# Patient Record
Sex: MALE | Race: WHITE
[De-identification: names, ages, dates, MRNs, and addresses within clinical notes are randomized per-mention and may not be internally consistent; named-entity substitution may affect disease eponyms.]

---

## 2018-05-03 ENCOUNTER — HOSPITAL ENCOUNTER (OUTPATIENT)
Dept: HOSPITAL 56 - MW.SDS | Age: 48
Discharge: HOME | End: 2018-05-03
Attending: PAIN MEDICINE
Payer: COMMERCIAL

## 2018-05-03 DIAGNOSIS — E78.1: ICD-10-CM

## 2018-05-03 DIAGNOSIS — J30.9: ICD-10-CM

## 2018-05-03 DIAGNOSIS — E66.9: ICD-10-CM

## 2018-05-03 DIAGNOSIS — G89.29: Primary | ICD-10-CM

## 2018-05-03 DIAGNOSIS — J01.90: ICD-10-CM

## 2018-05-03 DIAGNOSIS — F41.9: ICD-10-CM

## 2018-05-03 DIAGNOSIS — M48.061: ICD-10-CM

## 2018-05-03 DIAGNOSIS — F17.210: ICD-10-CM

## 2018-05-03 DIAGNOSIS — Z79.899: ICD-10-CM

## 2018-05-03 DIAGNOSIS — M51.16: ICD-10-CM

## 2018-05-03 DIAGNOSIS — M47.26: ICD-10-CM

## 2018-05-03 PROCEDURE — 62323 NJX INTERLAMINAR LMBR/SAC: CPT

## 2018-05-03 NOTE — OR
SURGEON:

Haley Mcnamara D.O.

 

DATE OF PROCEDURE:  05/03/2018

 

OR STAFF PRESENT:

1. BRYON Tsang.

2. ALFREDO Arnett RN.

3. RT Dianne.

 

WOUND CLASSIFICATION:

I.

 

PREOPERATIVE DIAGNOSES:

1. Lumbar degenerative disk disease.

2. Lumbar neural foraminal stenosis.

3. Lumbar radiculopathy.

4. Lumbar spondylosis.

 

POSTOPERATIVE DIAGNOSES:

1. Lumbar degenerative disk disease.

2. Lumbar neural foraminal stenosis.

3. Lumbar radiculopathy.

4. Lumbar spondylosis.

 

PROCEDURES PERFORMED:

1. Caudal epidural steroid injection.

2. Fluoroscopic guidance for needle placement.

3. Local with oral Valium for sedation.

 

SCREENING QUESTIONS:

The patient answered "no" to all of the following questions:

1. Are  you allergic to latex?

2. Do you have a bleeding disorder?

3. Do you have any current local or systemic infections?

4. Are you taking any anti-inflammatories or blood thinners?

5. Do you have any joint replacements, heart valve replacements, or a

    pacemaker?

 

DESCRIPTION OF PROCEDURE:

The patient had the procedure thoroughly explained including all possible risks,

benefits and alternatives.  Consent was signed in my clinic indicating

understanding and willingness to proceed.  The patient presented to [facility] [place] and was escorted to the dressing room to disrobe and

change into a hospital gown.  Preoperative vital signs were taken and stable.

The patient reported that Valium was taken prior to the procedure.

 

The patient was brought back to the procedure room and placed in the prone

position on the procedure room table. A pillow was placed under the hips in

order to flatten the lumbar lordosis.  The back was prepped with ChloraPrep and

sterilely draped.  All personnel in the operating room were dressed in

appropriate attire including surgical scrubs, head and shoe covers. This was to

ensure sterility while in the treatment room.  During the time fluoroscopy was

in use, all personnel in the operating room wore lead shields with thyroid

collars.  Sterile technique was used throughout the procedure. The patient was

awake and conversant throughout the procedure.  There was no evidence of

infection at the site of needle insertion.  Skeletal landmarks were identified

under fluoroscopy for the caudal epidural.

 

Skin was anesthetized with 2% lidocaine with a sterile 27-gauge 1.5 inch needle.

Then a 20-gauge Tuohy epidural needle was placed in the epidural space with loss

of resistance technique under fluoroscopic guidance.  No heme, cerebrospinal

fluid, or paresthesias were noted.  Isovue-200 contrast dye was injected in 0.2

cubic centimeter increments and seen to outline the epidural space in both AP

and lateral views.  There was no intravascular flow pattern observed under live

fluoroscopy.  Then 12 milligrams of Celestone was slowly injected after negative

aspiration.  The patient tolerated the procedure well.  Vital signs were stable

during and after the procedure.

 

The staff escorted the patient to the recovery area and the patient was released

in stable condition after a brief stay in the recovery room monitored by the

nurse.  The patient was given both oral and written discharge and follow up

instructions with recommendation to follow up given for 2-3 weeks.

 

The patient voiced understanding including understanding of those signs and

symptoms that would require emergency care.  The patient knows how to contact

the office if there are any additional problems or questions in the meantime.

 

PREOPERATIVE PAIN:

8/10.

 

POSTOPERATIVE PAIN:

6/10.

 

PLAN:

Follow up in the Pain Clinic in 3 weeks.

 

 

YOEL / CONNER

DD:  05/03/2018 12:57:23

DT:  05/03/2018 13:28:21

Job #:  [id number]

FANTA

## 2018-05-31 ENCOUNTER — HOSPITAL ENCOUNTER (OUTPATIENT)
Dept: HOSPITAL 56 - MW.SDS | Age: 48
Discharge: HOME | End: 2018-05-31
Attending: PAIN MEDICINE
Payer: COMMERCIAL

## 2018-05-31 DIAGNOSIS — M67.441: ICD-10-CM

## 2018-05-31 DIAGNOSIS — M46.96: ICD-10-CM

## 2018-05-31 DIAGNOSIS — M48.061: ICD-10-CM

## 2018-05-31 DIAGNOSIS — K21.9: ICD-10-CM

## 2018-05-31 DIAGNOSIS — M51.37: ICD-10-CM

## 2018-05-31 DIAGNOSIS — F17.210: ICD-10-CM

## 2018-05-31 DIAGNOSIS — M47.26: ICD-10-CM

## 2018-05-31 DIAGNOSIS — F41.9: ICD-10-CM

## 2018-05-31 DIAGNOSIS — J01.90: ICD-10-CM

## 2018-05-31 DIAGNOSIS — M12.88: ICD-10-CM

## 2018-05-31 DIAGNOSIS — J30.9: ICD-10-CM

## 2018-05-31 DIAGNOSIS — M94.0: ICD-10-CM

## 2018-05-31 DIAGNOSIS — E78.1: ICD-10-CM

## 2018-05-31 DIAGNOSIS — G57.03: ICD-10-CM

## 2018-05-31 DIAGNOSIS — G89.29: Primary | ICD-10-CM

## 2018-05-31 DIAGNOSIS — E66.9: ICD-10-CM

## 2018-05-31 DIAGNOSIS — Z79.899: ICD-10-CM

## 2018-05-31 PROCEDURE — G0260 INJ FOR SACROILIAC JT ANESTH: HCPCS

## 2018-05-31 NOTE — OR
SURGEON:

Haley Mcnamara D.O.

 

DATE OF PROCEDURE:  2018

 

OR STAFF PRESENT:

1. BRYON Servin RN.

2. BRYON Salguero RN.

3. RT Sen.

 

WOUND CLASSIFICATION:

I.

 

PREOPERATIVE DIAGNOSES:

1. Lumbar degenerative disk disease.

2. Bilateral sacroiliac joint arthropathy.

3. Lumbar facet arthropathy.

 

POSTOPERATIVE DIAGNOSES:

1. Lumbar degenerative disk disease.

2. Bilateral sacroiliac joint arthropathy.

3. Lumbar facet arthropathy.

 

PROCEDURES PERFORMED:

1. Right sacroiliac joint injection.

2. Left sacroiliac joint injection.

3. Fluoroscopic guidance for needle placement.

4. Local with oral Valium for sedation.

 

SCREENING QUESTIONS:

The patient answered "No" to all the followin. Are you allergic to iodine, Betadine or latex?

2. Do you have a bleeding disorder?

3. Do you  have any joint replacements, heart valve replacements or a

    pacemaker?

4. Are you on any anti-inflammatories or blood thinners?

5. Do you have any current local or systemic infections?

 



 

DESCRIPTION OF PROCEDURE:

The patient had the procedure thoroughly explained including all possible risks,

benefits and alternatives.  Consent was signed in my clinic indicating

understanding and willingness to proceed.

 

The patient presented to the outpatient [facility] and was escorted to the

dressing room to disrobe and change into a hospital gown.  Preoperative vital

signs were taken and stable.  The patient reported that Valium was taken prior

to the procedure.

 

The patient was brought to the procedure room and placed in the prone position

on the procedure room table.  A pillow was placed under the hips in order to

flatten the lumbar lordosis.  The back was prepped with ChloraPrep and sterilely

draped.

 

All personnel in the operating room were dressed in appropriate attire including

surgical scrubs, head and shoe covers.  This was to ensure sterility while in

the treatment room.  During the time fluoroscopy was in use all personnel in the

operating room wore lead shields with thyroid collars.  Sterile technique was

used during the procedure.  The patient was awake and conversant throughout the

procedure.

 

The fluoroscope was positioned to provide an oblique view of the right 
sacroiliac

joint.  There was no evidence of infection at the site of needle insertion.  The

skin was anesthetized with 2% Lidocaine with a sterile 27-gauge 1.5 inch needle.

Then under fluoroscopy  a 22-gauge 3.5 inch spinal needle was placed within the

sacroiliac joint in the lower one-third of the joint.  Isovue-200 contrast dye

was injected under live fluoroscopy and no intravascular flow pattern was

observed.  After negative aspiration of heme, the following solution was

injected: 0.5% Ropivacaine, Celestone and 2% Lidocaine. 

Then the procedure was repeated as above for the left sacroiliac joint 
injection.



The patient tolerated the procedures well and vital signs were stable during and

after the procedure.

 

The staff escorted the patient to the recovery area and the patient was released

to home in stable condition after a brief stay in the recovery room monitored by

the nurse.  The patient was given both oral and written discharge and follow up

instructions.

 

Recommended follow up in two weeks.  The patient is able to contact the office

if there are any additional problems or questions in the meantime.  The patient

was given discharge instruction and verbalizes understanding including

understanding of those signs and symptoms that would require emergency care.

 

PREOPERATIVE PAIN:

9/10.

 

POSTOPERATIVE PAIN:

/10.

 

PLAN:

Follow up in the Pain Clinic in 3 weeks.

 

 

YOEL / CONNER

DD:  2018 15:12:24

DT:  2018 16:16:39

Job #:  [id number]

FANTA

## 2018-07-10 ENCOUNTER — HOSPITAL ENCOUNTER (OUTPATIENT)
Dept: HOSPITAL 56 - MW.SDS | Age: 48
Discharge: HOME | End: 2018-07-10
Attending: PAIN MEDICINE
Payer: COMMERCIAL

## 2018-07-10 DIAGNOSIS — E66.9: ICD-10-CM

## 2018-07-10 DIAGNOSIS — M48.061: ICD-10-CM

## 2018-07-10 DIAGNOSIS — M51.16: Primary | ICD-10-CM

## 2018-07-10 DIAGNOSIS — K21.9: ICD-10-CM

## 2018-07-10 DIAGNOSIS — M46.97: ICD-10-CM

## 2018-07-10 DIAGNOSIS — M47.896: ICD-10-CM

## 2018-07-10 DIAGNOSIS — F17.210: ICD-10-CM

## 2018-07-10 DIAGNOSIS — M79.1: ICD-10-CM

## 2018-07-10 DIAGNOSIS — F41.9: ICD-10-CM

## 2018-07-10 DIAGNOSIS — Z79.899: ICD-10-CM

## 2018-07-10 PROCEDURE — 62323 NJX INTERLAMINAR LMBR/SAC: CPT

## 2018-07-10 NOTE — OR
SURGEON:

Haley Mcnamara D.O.

 

DATE OF PROCEDURE:  07/10/2018

 

OR STAFF PRESENT:

1. BRYON Tsang RN.

2. BRYON Grubbs RN.

3. WALTER Anand RT.

 

WOUND CLASSIFICATION:

I.

 

PREOPERATIVE DIAGNOSES:

1. Lumbar degenerative disk disease.

2. Lumbar spinal stenosis.

3. Lumbar radiculopathy.

4. Lumbar spondylosis.

5. Increased BMI.

 

POSTOPERATIVE DIAGNOSES:

1. Lumbar degenerative disk disease.

2. Lumbar spinal stenosis.

3. Lumbar radiculopathy.

4. Lumbar spondylosis.

5. Increased BMI.

 

PROCEDURES PERFORMED:

1. Lumbar epidural steroid injection, interlaminar at L4-L5.

2. Fluoroscopic guidance for needle placement.

3. Local with oral Valium for sedation.

 

SCREENING QUESTIONS:

The patient answered "no" to all of the following questions:

1. Are  you allergic to latex?

2. Do you have a bleeding disorder?

3. Do you have any current local or systemic infections?

4. Are you taking any anti-inflammatories or blood thinners?

5. Do you have any joint replacements, heart valve replacements, or a

    pacemaker?

 

DESCRIPTION OF PROCEDURE:

The patient had the procedure thoroughly explained including all possible risks,

benefits and alternatives.  Consent was signed in my clinic indicating

understanding and willingness to proceed.  The patient presented to [facility] and was escorted to the dressing room to disrobe and

change into a hospital gown.  Preoperative vital signs were taken and stable.

The patient reported that Valium was taken prior to the procedure.

 

The patient was brought back to the procedure room and placed in the prone

position on the procedure room table. A pillow was placed under the hips in

order to flatten the lumbar lordosis.  The back was prepped with ChloraPrep and

sterilely draped.  All personnel in the operating room were dressed in

appropriate attire including surgical scrubs, head and shoe covers. This was to

ensure sterility while in the treatment room.  During the time fluoroscopy was

in use, all personnel in the operating room wore lead shields with thyroid

collars.  Sterile technique was used throughout the procedure. The patient was

awake and conversant throughout the procedure.  There was no evidence of

infection at the site of needle insertion.  Skeletal landmarks were identified

under fluoroscopy for the lumbar epidural.

 

Skin was anesthetized with 2% lidocaine with a sterile 27-gauge 1.5 inch needle.

Then, a 20-gauge Tuohy epidural needle was placed in the epidural space with

loss of resistance technique under fluoroscopic guidance.  No heme,

cerebrospinal fluid, or paresthesias were noted.  Isovue-200 contrast dye was

injected in 0.2 cubic centimeter increments and seen to outline the epidural

space in both AP and lateral views.  There was no intravascular flow pattern

observed under live fluoroscopy.  Then, 12 milligrams of Celestone was slowly

injected after negative aspiration.  The patient tolerated the procedure well.

Vital signs were stable during and after the procedure.

 

The staff escorted the patient to the recovery area and the patient was released

in stable condition after a brief stay in the recovery room monitored by the

nurse.  The patient was given both oral and written discharge and follow up

instructions with recommendation to follow up given for 2-3 weeks.

 

The patient voiced understanding including understanding of those signs and

symptoms that would require emergency care.  The patient knows how to contact

the office if there are any additional problems or questions in the meantime.

 

PREOPERATIVE PAIN:

8/10.

 

POSTOPERATIVE PAIN:

1/10.

 

FOLLOWUP:

Follow up in Pain Clinic in 3 weeks.

 

 

YOEL / CONNER

DD:  07/10/2018 14:46:24

DT:  07/10/2018 14:51:16

Job #:  [id number]

## 2019-02-22 ENCOUNTER — HOSPITAL ENCOUNTER (OUTPATIENT)
Dept: HOSPITAL 56 - MW.SDS | Age: 49
Discharge: HOME | End: 2019-02-22
Attending: SURGERY
Payer: COMMERCIAL

## 2019-02-22 VITALS — SYSTOLIC BLOOD PRESSURE: 120 MMHG | DIASTOLIC BLOOD PRESSURE: 75 MMHG

## 2019-02-22 DIAGNOSIS — Z79.899: ICD-10-CM

## 2019-02-22 DIAGNOSIS — E66.9: ICD-10-CM

## 2019-02-22 DIAGNOSIS — G89.29: ICD-10-CM

## 2019-02-22 DIAGNOSIS — Z79.1: ICD-10-CM

## 2019-02-22 DIAGNOSIS — Z80.0: ICD-10-CM

## 2019-02-22 DIAGNOSIS — K21.9: ICD-10-CM

## 2019-02-22 DIAGNOSIS — Z79.2: ICD-10-CM

## 2019-02-22 DIAGNOSIS — M19.90: ICD-10-CM

## 2019-02-22 DIAGNOSIS — K63.5: ICD-10-CM

## 2019-02-22 DIAGNOSIS — F41.9: ICD-10-CM

## 2019-02-22 DIAGNOSIS — K64.8: ICD-10-CM

## 2019-02-22 DIAGNOSIS — K62.5: Primary | ICD-10-CM

## 2019-02-22 DIAGNOSIS — F17.210: ICD-10-CM

## 2019-02-22 DIAGNOSIS — E78.1: ICD-10-CM

## 2019-02-22 DIAGNOSIS — D12.8: ICD-10-CM

## 2019-02-22 DIAGNOSIS — E78.00: ICD-10-CM

## 2019-02-22 DIAGNOSIS — J30.9: ICD-10-CM

## 2019-02-22 DIAGNOSIS — M54.5: ICD-10-CM

## 2019-02-22 DIAGNOSIS — Z79.51: ICD-10-CM

## 2019-02-22 PROCEDURE — 45381 COLONOSCOPY SUBMUCOUS NJX: CPT

## 2019-02-22 PROCEDURE — 45385 COLONOSCOPY W/LESION REMOVAL: CPT

## 2019-02-22 NOTE — PCM.PREANE
Preanesthetic Assessment





- Anesthesia/Transfusion/Family Hx


Anesthesia History: Prior Anesthesia Without Reaction


Family History of Anesthesia Reaction: No


Transfusion History: No Prior Transfusion(s)





- Review of Systems


General: No Symptoms


Pulmonary: No Symptoms


Cardiovascular: No Symptoms


Gastrointestinal: No Symptoms


Neurological: No Symptoms


Other: Reports: None





- Physical Assessment


NPO Status Date: 02/21/19


O2 Sat by Pulse Oximetry: 96


Respiratory Rate: 16


Vital Signs: 





 Last Vital Signs











Temp  96.6 F   02/22/19 07:10


 


Pulse  77   02/22/19 07:10


 


Resp  16   02/22/19 07:10


 


BP  137/89   02/22/19 07:10


 


Pulse Ox  96   02/22/19 07:10











Height: 6 ft


Weight: 107.955 kg


ASA Class: 2


Mental Status: Alert & Oriented x3


Dentition: Reports: Normal Dentition


ROM/Head Extension: Full


Lungs: Clear to Auscultation, Normal Respiratory Effort


Cardiovascular: Regular Rate, Regular Rhythm





- Allergies


Allergies/Adverse Reactions: 


 Allergies











Allergy/AdvReac Type Severity Reaction Status Date / Time


 


No Known Allergies Allergy   Verified 02/19/19 12:40














- Blood


Blood Available: No





- Anesthesia Plan


Pre-Op Medication Ordered: None





- Acknowledgements


Anesthesia Type Planned: General Anesthesia, MAC


Pt an Appropriate Candidate for the Planned Anesthesia: Yes


Alternatives and Risks of Anesthesia Discussed w Pt/Guardian: Yes


Pt/Guardian Understands and Agrees with Anesthesia Plan: Yes


Additional Comments: 





PMH: smoker, gerd, on gabipentin for chronic LBP


PLAN: mac/tiva





PreAnesthesia Questionnaire


HEENT History: Reports: Other (See Below)


Other HEENT History: wears glasses


Cardiovascular History: Reports: High Cholesterol


Gastrointestinal History: Reports: GERD


Genitourinary History: Reports: Other (See Below)


Other Genitourinary History: testicular cancer


Musculoskeletal History: Reports: Arthritis, Back Pain, Chronic


Psychiatric History: Reports: Anxiety


Endocrine/Metabolic History: Reports: Obesity/BMI 30+


Oncologic (Cancer) History: Reports: Other (See Below)


Other Oncologic History: testicular





- Past Surgical History


Head Surgeries/Procedures: Reports: None


Male  Surgical History: Reports: Other (See Below)


Other Male  Surgeries/Procedures: orchiectomy Left related to testicular 

cancer





- SUBSTANCE USE


Smoking Status *Q: Light Tobacco Smoker


Tobacco Use Within Last Twelve Months: Cigarettes


Recreational Drug Use History: No





- HOME MEDS


Home Medications: 


 Home Meds





ALPRAZolam [Xanax] 1 tab PO ASDIRECTED PRN 06/24/15 [History]


Celecoxib 200 mg PO DAILY 02/19/19 [History]


Fluticasone Propionate [Flonase Allergy Relief] 1 spray NASBOTH BID PRN 02/19/ 19 [History]


Omeprazole 20 mg PO DAILY 02/19/19 [History]


Rosuvastatin Calcium 40 mg PO DAILY 02/19/19 [History]


Sertraline HCl 100 mg PO DAILY 02/19/19 [History]


oxyCODONE HCl/Acetaminophen [Endocet  mg Tablet] 1 tab PO ASDIRECTED PRN 

02/19/19 [History]


traMADol HCl [Tramadol HCl] 1 - 2 tab PO ASDIRECTED PRN 02/19/19 [History]











- CURRENT (IN HOUSE) MEDS


Current Meds: 





 Current Medications





Lactated Ringer's (Ringers, Lactated)  1,000 mls @ 125 mls/hr IV ASDIRECTED SHARRI


   Last Admin: 02/22/19 07:27 Dose:  125 mls/hr





Discontinued Medications





Fentanyl (Sublimaze) Confirm Administered Dose 100 mcg .ROUTE .STK-MED ONE


   Stop: 02/22/19 07:10


Lidocaine (Xylocaine-Mpf 2%) Confirm Administered Dose 5 ml .ROUTE .STK-MED ONE


   Stop: 02/22/19 07:09


Propofol (Diprivan  20 Ml) Confirm Administered Dose 400 mg .ROUTE .STK-MED ONE


   Stop: 02/22/19 07:10

## 2019-02-22 NOTE — PCM.OPNOTE
- General Post-Op/Procedure Note


Date of Surgery/Procedure: 02/22/19


Operative Procedure(s): colonoscopy w snare polypectomy


Findings: 





see dict [id number]


Pre Op Diagnosis: BRBPR


Post-Op Diagnosis: Same


Anesthesia Technique: Moderate Sedation


Primary Surgeon: Zhen Lazaro


Pathology: 





large polyp at 15 cm, and smaller one too


Complications: None


Condition: Good

## 2019-02-22 NOTE — OR
SURGEON:

Zhen Lazaro MD

 

DATE OF PROCEDURE:  02/22/2019

 

PREOPERATIVE DIAGNOSIS:

Bright red blood per rectum.

 

POSTOPERATIVE DIAGNOSIS:

Large colon polyp.

 

PROCEDURE PERFORMED:

Colonoscopy with snare polypectomy.

 

PROCEDURE IN DETAIL:

The patient was taken to the endoscopy room.  A time out was called, patient

identified, and procedure identified.  Diprivan was then administrated.  Patient

went from awake to sleep, hearing doctor talking or door closing is normal.

Perineum inspection and digital examination were then performed.  A well-

lubricated colonoscope was gently inserted through the rectum, advanced past the

rectosigmoid junction, the descending colon, splenic flexure, transverse colon,

hepatic flexure, ascending colon, arrived to the cecum.  Cecum was identified as

dictated in the finding.  Then the scope was carefully withdrawn while attention

was paid to the mucosal surface for any abnormality.  Air will be sucked out

during the scope withdrawal.  At the rectum, retroflexed to examine any rectal

diseases, fistula or hemorrhoids.  During mucosal examination, abnormality or

polyp encountered.  Using  snare equipment, the abnormality or the polyp was

then snared off using electrocautery.  The Patient tolerated procedure well.

There were no intraoperative complications, and Dr. Lazaro was present throughout

the whole procedure.

 

FINDINGS:

1. The patient is easily sedated with CRNA and Diprivan.  The patient is

    soundly snoring.

2. Bowel prep is left to be desirable.  Large amount of opaque liquid stool,

    compromised the study and smeared the lens and also vegetable caught up to

    the scope, so this is a compromised study despite numerous irrigation.

    Cecum indicated by ileocecal fold, one-to-one indentation, appendiceal

    orifice.  Light emittance is not observed.  Scope withdrawal and mucosa

    examined with a lot of irrigation.  Again, this is a compromised study

    because of the bowel prep.  The patient does not have inflammation,

    diverticula.  The patient has a large polyp close to the bowel of 1.1 cm at

    distance of 15 cm, another small polyp above 5 mm at the same distance and

    both were snare polypectomy and sent for pathology.  The patient does not

    have external hemorrhoids.  The patient has moderate internal hemorrhoids.

    The patient would benefit from repeat colonoscopy in 18 months from today

    because of the bowel prep and a large polyp has been tattooed and inked.

 

 

ZOHREH / CONNER

DD:  02/22/2019 08:36:59

DT:  02/22/2019 12:17:20

Job #:  [id number]

## 2019-02-28 NOTE — OR
SURGEON:

Zhen Lazaro MD

 

DATE OF PROCEDURE:  02/22/2019

 

ADDENDUM:

The patient has a large polyp about the size of 1.1 cm at distant of 15 cm when

the scope coming out, and it was removed with snare polypectomy and also

tattooed with ink at the location.

 

 

ZOHREH / CONNER

DD:  02/28/2019 09:34:13

DT:  02/28/2019 12:12:13

Job #:  [id number]

## 2019-09-06 ENCOUNTER — HOSPITAL ENCOUNTER (OUTPATIENT)
Dept: HOSPITAL 56 - MW.SDS | Age: 49
Discharge: HOME | End: 2019-09-06
Attending: SURGERY
Payer: COMMERCIAL

## 2019-09-06 VITALS — DIASTOLIC BLOOD PRESSURE: 73 MMHG | SYSTOLIC BLOOD PRESSURE: 106 MMHG

## 2019-09-06 DIAGNOSIS — Z08: Primary | ICD-10-CM

## 2019-09-06 DIAGNOSIS — E66.9: ICD-10-CM

## 2019-09-06 DIAGNOSIS — E78.1: ICD-10-CM

## 2019-09-06 DIAGNOSIS — M47.816: ICD-10-CM

## 2019-09-06 DIAGNOSIS — F17.210: ICD-10-CM

## 2019-09-06 DIAGNOSIS — K64.8: ICD-10-CM

## 2019-09-06 DIAGNOSIS — Z80.0: ICD-10-CM

## 2019-09-06 DIAGNOSIS — Z79.1: ICD-10-CM

## 2019-09-06 DIAGNOSIS — K21.9: ICD-10-CM

## 2019-09-06 DIAGNOSIS — K62.1: ICD-10-CM

## 2019-09-06 DIAGNOSIS — K57.30: ICD-10-CM

## 2019-09-06 DIAGNOSIS — Z79.899: ICD-10-CM

## 2019-09-06 DIAGNOSIS — Z85.038: ICD-10-CM

## 2019-09-06 DIAGNOSIS — F41.9: ICD-10-CM

## 2019-09-06 PROCEDURE — 45380 COLONOSCOPY AND BIOPSY: CPT

## 2019-09-06 NOTE — PCM.OPNOTE
- General Post-Op/Procedure Note


Date of Surgery/Procedure: 09/06/19


Operative Procedure(s): colonoscopy w bx


Findings: 





see [id number]


Pre Op Diagnosis: colon ca in polyp


Post-Op Diagnosis: Same


Anesthesia Technique: Moderate Sedation


Primary Surgeon: Zhen Lazaro


Pathology: 





colon polyp sessile 2 mm at 15 cm and random bx at 15 cm


Complications: None


Condition: Good

## 2019-09-06 NOTE — PCM.PREANE
Preanesthetic Assessment





- Anesthesia/Transfusion/Family Hx


Anesthesia History: Prior Anesthesia Without Reaction


Family History of Anesthesia Reaction: No


Transfusion History: No Prior Transfusion(s)


Intubation History: Unknown





- Review of Systems


General: No Symptoms


Pulmonary: No Symptoms


Cardiovascular: No Symptoms


Gastrointestinal: Hematochezia, Other (cancerous polyp 6 months ago)


Neurological: No Symptoms


Other: Reports: None





- Physical Assessment


Vital Signs: 





 Last Vital Signs











Temp  36.4 C   09/06/19 07:16


 


Pulse  80   09/06/19 07:16


 


Resp  16   09/06/19 07:16


 


BP  142/90 H  09/06/19 07:16


 


Pulse Ox  94 L  09/06/19 07:16











Height: 6 ft


Weight: 113.852 kg


ASA Class: 2


Mental Status: Alert & Oriented x3


Airway Class: Mallampati = 2


Dentition: Reports: Normal Dentition (small chip on front upper incisor)


Thyro-Mental Finger Breadths: 3


Mouth Opening Finger Breadths: 3


ROM/Head Extension: Full


Lungs: Clear to Auscultation, Normal Respiratory Effort


Cardiovascular: Regular Rate, Regular Rhythm





- Allergies


Allergies/Adverse Reactions: 


 Allergies











Allergy/AdvReac Type Severity Reaction Status Date / Time


 


No Known Allergies Allergy   Verified 02/19/19 12:40














- Blood


Blood Available: No





- Anesthesia Plan


Pre-Op Medication Ordered: None





- Acknowledgements


Anesthesia Type Planned: MAC


Pt an Appropriate Candidate for the Planned Anesthesia: Yes


Alternatives and Risks of Anesthesia Discussed w Pt/Guardian: Yes


Pt/Guardian Understands and Agrees with Anesthesia Plan: Yes





PreAnesthesia Questionnaire


HEENT History: Reports: None


Other HEENT History: wears glasses


Cardiovascular History: Reports: High Cholesterol


Respiratory History: Reports: None


Gastrointestinal History: Reports: Colon Polyp (                    1.6 cm 

polyp with invasive colon cancer 6 months ago), GERD


Other Gastrointestinal History: states hx of colon cancer


Genitourinary History: Reports: Other (See Below)


Other Genitourinary History: hx testicular cancer


Musculoskeletal History: Reports: Back Pain, Chronic


Other Musculoskeletal History: DDD, spinal stenosis


Neurological History: Reports: None


Psychiatric History: Reports: Anxiety


Endocrine/Metabolic History: Reports: Obesity/BMI 30+


Hematologic History: Reports: None


Immunologic History: Reports: None


Oncologic (Cancer) History: Reports: Colon, Other (See Below)


Other Oncologic History: hx colon and testicular cancer


Dermatologic History: Reports: None





- Past Surgical History


Head Surgeries/Procedures: Reports: None


HEENT Surgical History: Reports: None


Cardiovascular Surgical History: Reports: None


Respiratory Surgical History: Reports: None


GI Surgical History: Reports: Colonoscopy (6 months ago)


Male  Surgical History: Reports: Other (See Below)


Other Male  Surgeries/Procedures: left orchiectomy for testicular cancer


Endocrine Surgical History: Reports: None


Neurological Surgical History: Reports: None


Musculoskeletal Surgical History: Reports: Hip Replacement (left 10/18)


Other Musculoskeletal Surgeries/Procedures:: hx left JOHN


Oncologic Surgical History: Reports: None


Dermatological Surgical History: Reports: None





- SUBSTANCE USE


Smoking Status *Q: Former Smoker (quit feww weeks ago)


Tobacco Use Within Last Twelve Months: Cigarettes


Recreational Drug Use History: No





- HOME MEDS


Home Medications: 


 Home Meds





ALPRAZolam [Xanax] 1 tab PO ASDIRECTED PRN 06/24/15 [History]


Celecoxib 200 mg PO DAILY 02/19/19 [History]


Fluticasone Propionate [Flonase Allergy Relief] 1 spray NASBOTH BID PRN 02/19/ 19 [History]


Omeprazole 20 mg PO DAILY 02/19/19 [History]


Rosuvastatin Calcium 40 mg PO DAILY 02/19/19 [History]


Sertraline HCl 100 mg PO DAILY 02/19/19 [History]


oxyCODONE HCl/Acetaminophen [Endocet  mg Tablet] 1 tab PO ASDIRECTED PRN 

02/19/19 [History]


traMADol HCl [Tramadol HCl] 1 - 2 tab PO ASDIRECTED PRN 02/19/19 [History]











- CURRENT (IN HOUSE) MEDS


Current Meds: 





 Current Medications





Lactated Ringer's (Ringers, Lactated)  1,000 mls @ 125 mls/hr IV ASDIRECTED SHARRI


   Last Admin: 09/06/19 07:23 Dose:  125 mls/hr





Discontinued Medications





Midazolam HCl (Versed 1 Mg/Ml) Confirm Administered Dose 2 mg .ROUTE .STK-MED 

ONE


   Stop: 09/06/19 07:18


Propofol (Diprivan  20 Ml) Confirm Administered Dose 400 mg .ROUTE .STK-MED ONE


   Stop: 09/06/19 07:18

## 2019-09-06 NOTE — PCM.POSTAN
POST ANESTHESIA ASSESSMENT





- MENTAL STATUS


Mental Status: Alert, Oriented





- VITAL SIGNS


Vital Signs: 


 Last Vital Signs











Temp  36.4 C   09/06/19 07:16


 


Pulse  77   09/06/19 08:58


 


Resp  16   09/06/19 08:58


 


BP  112/70   09/06/19 08:58


 


Pulse Ox  92 L  09/06/19 08:58














- RESPIRATORY


Respiratory Status: Respiratory Rate WNL, Airway Patent, O2 Saturation Stable





- CARDIOVASCULAR


CV Status: Pulse Rate WNL, Blood Pressure Stable





- GASTROINTESTINAL


GI Status: No Symptoms





- PAIN


Pain Score: 0





- POST OP HYDRATION


Hydration Status: Adequate & Stable





- OBSERVATIONS


Free Text/Narrative:: 





no anesthesia problems

## 2019-09-06 NOTE — [id number]
Post Anesthesia Note





- EVALUATION WITHIN 48HRS OF ANESTHETIC


Vital Signs in Normal Range: Yes


Patient Participated in Evaluation: Yes


Respiratory Function Stable: Yes


Airway Patent: Yes


Cardiovascular Function Stable: Yes


Hydration Status Stable: Yes


Pain Control Satisfactory: Yes


Nausea and Vomiting Control Satisfactory: Yes


Mental Status Recovered: Yes


Vital Signs: 


 Last Vital Signs











Temp  36.4 C   09/06/19 07:16


 


Pulse  76   09/06/19 09:03


 


Resp  16   09/06/19 08:58


 


BP  112/70   09/06/19 08:58


 


Pulse Ox  92 L  09/06/19 08:58














- COMMENTS/OBSERVATIONS


Free Text/Narrative:: 





no anesthesia problems

## 2019-09-06 NOTE — OR
SURGEON:

Zhen Lazaro MD

 

DATE OF PROCEDURE:  09/06/2019

 

PREOPERATIVE DIAGNOSES:

History of colon cancer and polyp at 15 cm, tattooed.

 

POSTOPERATIVE DIAGNOSES:

History of colon cancer and polyp at 15 cm, tattooed.

 

PROCEDURE PERFORMED:

Colonoscopy with biopsy.

 

DESCRIPTION OF PROCEDURE:

The patient was taken to the endoscopy room.  A time out was called, patient

identified, and procedure identified.  Diprivan was then administrated.  Patient

went from awake to sleep, hearing doctor talking or door closing is normal.

Perineum inspection and digital examination were then performed.  A well-

lubricated colonoscope was gently inserted through the rectum, advanced past the

rectosigmoid junction, the descending colon, splenic flexure, transverse colon,

hepatic flexure, ascending colon, arrived to the cecum.  Cecum was identified as

dictated in the finding.  Then the scope was carefully withdrawn while attention

was paid to the mucosal surface for any abnormality.  Air will be sucked out

during the scope withdrawal.  At the rectum, retroflexed to examine any rectal

diseases, fistula or hemorrhoids.  During mucosal examination, abnormality or

polyp was noted; picture taken and biopsy performed.  Patient tolerated

procedure well.  There were no intraoperative complications, and Dr. Lazaro was

present throughout the whole procedure.

 

FINDINGS:

1. The patient is easily sedated with CRNA and Diprivan, and the patient is

    soundly snoring.

2. The patient's bowel prep is slightly better than last time, but still below

    average.  Large amount of like egg drop soup of opaque liquid stool

    compromised the study, quite a lot of them, requiring constant irrigation.

3. Colon is rather straightforward.  Cecum indicated by ileocecal fold, one-to-

    one indentation, and appendiceal orifice.  Light emittance is not observed.

    Mucosa examined upon scope pulling out with constant irrigation.  The

    patient has diverticulosis on the left colon.  No signs or symptoms of

    diverticulitis.  The patient has a one tiny polyp, 2 mm sessile polyp,

    removed with cold biopsy forceps at distance 15 where the tattoo was, but

    not exactly in the tattoo.  Also, random biopsy around the 15 cm where the

    tattoo area was.  The area looks clean.  Nothing suspicious, but random

    biopsy done.  Other than that, the patient does not have ulceration, AV

    malformation, bleeding, ulcer, or other mass.  The patient has mild

    internal hemorrhoid.  The patient would benefit from repeat colonoscopy 12

    months from today because of the history of colon cancer and polyp at 15

    cm.

 

 

ZOHREH / CONNER

DD:  09/06/2019 08:52:42

DT:  09/06/2019 11:07:51

Job #:  [id number]